# Patient Record
Sex: MALE | Race: WHITE | Employment: OTHER | URBAN - METROPOLITAN AREA
[De-identification: names, ages, dates, MRNs, and addresses within clinical notes are randomized per-mention and may not be internally consistent; named-entity substitution may affect disease eponyms.]

---

## 2024-01-08 ENCOUNTER — OFFICE VISIT (OUTPATIENT)
Dept: OPHTHALMOLOGY | Facility: CLINIC | Age: 64
End: 2024-01-08
Payer: MEDICARE

## 2024-01-08 DIAGNOSIS — H25.13 AGE-RELATED NUCLEAR CATARACT OF BOTH EYES: ICD-10-CM

## 2024-01-08 DIAGNOSIS — H18.603 KERATOCONUS OF BOTH EYES: Primary | ICD-10-CM

## 2024-01-08 DIAGNOSIS — H01.029 SQUAMOUS BLEPHARITIS OF BOTH UPPER AND LOWER EYELID: ICD-10-CM

## 2024-01-08 DIAGNOSIS — H52.4 PRESBYOPIA OF BOTH EYES: ICD-10-CM

## 2024-01-08 DIAGNOSIS — H52.213 IRREGULAR ASTIGMATISM OF BOTH EYES: ICD-10-CM

## 2024-01-08 DIAGNOSIS — H04.123 DRY EYE SYNDROME OF BOTH EYES: ICD-10-CM

## 2024-01-08 PROCEDURE — 92014 COMPRE OPH EXAM EST PT 1/>: CPT | Performed by: STUDENT IN AN ORGANIZED HEALTH CARE EDUCATION/TRAINING PROGRAM

## 2024-01-08 PROCEDURE — 92015 DETERMINE REFRACTIVE STATE: CPT | Performed by: STUDENT IN AN ORGANIZED HEALTH CARE EDUCATION/TRAINING PROGRAM

## 2024-01-08 RX ORDER — LEVOTHYROXINE SODIUM 125 UG/1
TABLET ORAL
COMMUNITY

## 2024-01-08 RX ORDER — LAMOTRIGINE 100 MG/1
TABLET ORAL
COMMUNITY

## 2024-01-08 RX ORDER — ASPIRIN 325 MG
50000 TABLET, DELAYED RELEASE (ENTERIC COATED) ORAL
COMMUNITY
Start: 2023-12-13

## 2024-01-08 RX ORDER — ACETAMINOPHEN, DIPHENHYDRAMINE HCL, PHENYLEPHRINE HCL 325; 25; 5 MG/1; MG/1; MG/1
TABLET ORAL
COMMUNITY

## 2024-01-08 ASSESSMENT — ENCOUNTER SYMPTOMS
NEUROLOGICAL NEGATIVE: 0
RESPIRATORY NEGATIVE: 0
ENDOCRINE NEGATIVE: 0
CONSTITUTIONAL NEGATIVE: 0
PSYCHIATRIC NEGATIVE: 0
HEMATOLOGIC/LYMPHATIC NEGATIVE: 0
ALLERGIC/IMMUNOLOGIC NEGATIVE: 0
GASTROINTESTINAL NEGATIVE: 0
EYES NEGATIVE: 0
CARDIOVASCULAR NEGATIVE: 0
MUSCULOSKELETAL NEGATIVE: 0

## 2024-01-08 ASSESSMENT — CONF VISUAL FIELD
OD_NORMAL: 1
OD_SUPERIOR_NASAL_RESTRICTION: 0
OS_INFERIOR_NASAL_RESTRICTION: 0
OS_NORMAL: 1
METHOD: COUNTING FINGERS
OD_SUPERIOR_TEMPORAL_RESTRICTION: 0
OD_INFERIOR_NASAL_RESTRICTION: 0
OS_SUPERIOR_NASAL_RESTRICTION: 0
OS_SUPERIOR_TEMPORAL_RESTRICTION: 0
OD_INFERIOR_TEMPORAL_RESTRICTION: 0
OS_INFERIOR_TEMPORAL_RESTRICTION: 0

## 2024-01-08 ASSESSMENT — CUP TO DISC RATIO
OD_RATIO: .20
OS_RATIO: .20

## 2024-01-08 ASSESSMENT — REFRACTION_CURRENTRX
OD_SPHERE: -6.75
OS_SPHERE: -7.75
OS_DIAMETER: 9.6
OS_CYLINDER: SPHERE
OS_BRAND: RGP ESSILOR
OD_DIAMETER: 9.6
OS_BASECURVE: 7.3
OD_CYLINDER: SPHERE
OD_BASECURVE: 7.5
OD_BRAND: RGP ESSILOR

## 2024-01-08 ASSESSMENT — REFRACTION_MANIFEST
OD_ADD: +2.50
OS_CYLINDER: +2.50
OS_ADD: +2.50
OD_AXIS: 162
OD_CYLINDER: +3.50
OS_SPHERE: -8.50
OS_AXIS: 035
OD_SPHERE: -7.75

## 2024-01-08 ASSESSMENT — REFRACTION_WEARINGRX
OD_AXIS: 170
OS_CYLINDER: +2.50
OS_SPHERE: -8.50
OD_CYLINDER: +3.25
OS_AXIS: 035
OD_SPHERE: -7.50

## 2024-01-08 ASSESSMENT — TONOMETRY
OS_IOP_MMHG: 19
IOP_METHOD: TONOPEN
OD_IOP_MMHG: 21

## 2024-01-08 ASSESSMENT — VISUAL ACUITY
METHOD: SNELLEN - LINEAR
CORRECTION_TYPE: GLASSES
OD_CC: 20/40
OS_CC: 20/40

## 2024-01-08 ASSESSMENT — EXTERNAL EXAM - LEFT EYE: OS_EXAM: DERMATOCHALASIS

## 2024-01-08 ASSESSMENT — EXTERNAL EXAM - RIGHT EYE: OD_EXAM: DERMATOCHALASIS

## 2024-01-08 NOTE — PROGRESS NOTES
Assessment/Plan   Diagnoses and all orders for this visit:  Keratoconus of both eyes  Irregular astigmatism of both eyes  -patient is doing well with current rigid gas permeable (RGP) [employer] lenses. Good comfort and fit  -continue with habitual pair  Dry eye syndrome of both eyes  Squamous blepharitis of both upper and lower eyelid  -asymptomatic. Pt ed to continue with lid hygiene and OTC AT's  Age-related nuclear cataract of both eyes  -non visually significant  Presbyopia of both eyes  -New spec rx released today per patient request. Refraction billed today.    RTC 1 year for annual with DFE, MRX, and CL exam

## 2025-01-30 ENCOUNTER — NURSING HOME VISIT (OUTPATIENT)
Dept: POST ACUTE CARE | Facility: EXTERNAL LOCATION | Age: 65
End: 2025-01-30
Payer: COMMERCIAL

## 2025-01-30 DIAGNOSIS — N18.9 CHRONIC KIDNEY DISEASE, UNSPECIFIED CKD STAGE: ICD-10-CM

## 2025-01-30 DIAGNOSIS — D69.6 THROMBOCYTOPENIA (CMS-HCC): ICD-10-CM

## 2025-01-30 DIAGNOSIS — S72.002D CLOSED FRACTURE OF LEFT HIP WITH ROUTINE HEALING, SUBSEQUENT ENCOUNTER: Primary | ICD-10-CM

## 2025-01-30 DIAGNOSIS — R53.1 WEAKNESS: ICD-10-CM

## 2025-01-30 DIAGNOSIS — R56.9 SEIZURE (MULTI): ICD-10-CM

## 2025-01-30 DIAGNOSIS — Z91.81 AT RISK FOR FALLS: ICD-10-CM

## 2025-01-30 DIAGNOSIS — D64.9 ANEMIA, UNSPECIFIED TYPE: ICD-10-CM

## 2025-01-30 DIAGNOSIS — U07.1 COVID-19: ICD-10-CM

## 2025-01-30 DIAGNOSIS — G47.30 SLEEP APNEA, UNSPECIFIED TYPE: ICD-10-CM

## 2025-01-30 PROCEDURE — 99305 1ST NF CARE MODERATE MDM 35: CPT | Performed by: INTERNAL MEDICINE

## 2025-01-30 NOTE — LETTER
Patient: Ilir Murillo  : 1960    Encounter Date: 2025    PLACE OF SERVICE:  [facility].    This is new/initial history and physical.    Subjective  Patient ID: Ilir Murillo is a 64 y.o. male who presents for New Patient Visit.    Mr. Jero Murillo is a 64-year-old male with recent fall and fracture to his left femur.  He suffers from seizures with recent COVID-19 infection.  He requires supportive care.    Review of Systems   Constitutional:  Negative for chills and fever.   Cardiovascular:  Negative for chest pain.   All other systems reviewed and are negative.    Objective  /78   Pulse 76   Temp 36.5 °C (97.7 °F)   Resp 16     Physical Exam  Vitals reviewed.   Constitutional:       Comments: This is a well-developed, well-nourished male, lying in bed, appearing weak.   HENT:      Right Ear: Tympanic membrane, ear canal and external ear normal.      Left Ear: Tympanic membrane, ear canal and external ear normal.   Eyes:      General: No scleral icterus.     Pupils: Pupils are equal, round, and reactive to light.   Neck:      Vascular: No carotid bruit.   Cardiovascular:      Heart sounds: Normal heart sounds, S1 normal and S2 normal. No murmur heard.     No friction rub.   Pulmonary:      Effort: Pulmonary effort is normal.      Breath sounds: Normal breath sounds and air entry.   Abdominal:      Palpations: There is no hepatomegaly, splenomegaly or mass.   Musculoskeletal:         General: No swelling or deformity. Normal range of motion.      Cervical back: Neck supple.      Right lower leg: No edema.      Left lower leg: No edema.   Lymphadenopathy:      Cervical: No cervical adenopathy.      Upper Body:      Right upper body: No axillary adenopathy.      Left upper body: No axillary adenopathy.      Lower Body: No right inguinal adenopathy. No left inguinal adenopathy.   Skin:     Comments: The patient's left hip wound is intact with no sign of infection.   Neurological:       Mental Status: He is oriented to person, place, and time. He is lethargic.      Cranial Nerves: Cranial nerves 2-12 are intact. No cranial nerve deficit.      Sensory: No sensory deficit.      Motor: Motor function is intact. No weakness.      Gait: Gait is intact.      Deep Tendon Reflexes: Reflexes normal.   Psychiatric:         Mood and Affect: Mood normal. Mood is not anxious or depressed. Affect is not angry.         Behavior: Behavior is not agitated.         Thought Content: Thought content normal.         Judgment: Judgment normal.     LAB WORK:  Laboratory studies were reviewed.    Assessment/Plan  Problem List Items Addressed This Visit    None  Visit Diagnoses         Codes    Closed fracture of left hip with routine healing, subsequent encounter    -  Primary S72.002D    Seizure (Multi)     R56.9    COVID-19     U07.1    Chronic kidney disease, unspecified CKD stage     N18.9    Sleep apnea, unspecified type     G47.30    Anemia, unspecified type     D64.9    Thrombocytopenia (CMS-[facility])     D69.6    Weakness     R53.1    At risk for falls     Z91.81        1. Left hip fracture, on pain control.  2. Seizure disorder, on antiepileptic.  3. COVID-19, stable.  4. CKD.  Monitor BMP.  5. Sleep apnea, on CPAP.  6. Anemia.  Follow CBC.  7. Thrombocytopenia.  Monitor platelet level.  8. Weakness, on PT/OT.  9. Fall risk, on fall precautions.    Scribe Attestation  By signing my name below, IBritta Scribe attest that this documentation has been prepared under the direction and in the presence of Arin Aguiar MD.     All medical record entries made by the scribe were personally dictated by me I have reviewed the chart and agree the record accurately reflects my personal performance of his history physical examination and management      Electronically Signed By: Arin Aguiar MD   2/23/25 12:01 AM

## 2025-02-09 ENCOUNTER — NURSING HOME VISIT (OUTPATIENT)
Dept: POST ACUTE CARE | Facility: EXTERNAL LOCATION | Age: 65
End: 2025-02-09
Payer: COMMERCIAL

## 2025-02-09 DIAGNOSIS — S72.002D CLOSED FRACTURE OF LEFT HIP WITH ROUTINE HEALING, SUBSEQUENT ENCOUNTER: Primary | ICD-10-CM

## 2025-02-09 DIAGNOSIS — G40.909 SEIZURE DISORDER (MULTI): ICD-10-CM

## 2025-02-09 DIAGNOSIS — G47.30 SLEEP APNEA, UNSPECIFIED TYPE: ICD-10-CM

## 2025-02-09 DIAGNOSIS — Z91.81 AT RISK FOR FALLING: ICD-10-CM

## 2025-02-09 DIAGNOSIS — U07.1 COVID-19: ICD-10-CM

## 2025-02-09 DIAGNOSIS — R53.1 WEAKNESS: ICD-10-CM

## 2025-02-09 DIAGNOSIS — J96.02 ACUTE RESPIRATORY FAILURE WITH HYPOXIA AND HYPERCAPNIA (MULTI): ICD-10-CM

## 2025-02-09 DIAGNOSIS — D69.6 THROMBOCYTOPENIA (CMS-HCC): ICD-10-CM

## 2025-02-09 DIAGNOSIS — C90.02 MULTIPLE MYELOMA IN RELAPSE (MULTI): ICD-10-CM

## 2025-02-09 DIAGNOSIS — J96.01 ACUTE RESPIRATORY FAILURE WITH HYPOXIA AND HYPERCAPNIA (MULTI): ICD-10-CM

## 2025-02-09 DIAGNOSIS — D64.9 ANEMIA, UNSPECIFIED TYPE: ICD-10-CM

## 2025-02-09 DIAGNOSIS — N18.9 CHRONIC KIDNEY DISEASE, UNSPECIFIED CKD STAGE: ICD-10-CM

## 2025-02-09 PROCEDURE — 99308 SBSQ NF CARE LOW MDM 20: CPT | Performed by: INTERNAL MEDICINE

## 2025-02-09 NOTE — LETTER
Patient: Ilir Murillo  : 1960    Encounter Date: 2025    PLACE OF SERVICE:  [facility]    This is a subsequent visit.    Subjective  Patient ID: Ilir Murillo is a 64 y.o. male who presents for Follow-up.    Mr. Jero Murillo is a 64-year-old male with recent history of fall and fracture to his left femur.  He is unable to care for himself and requires supportive care.    Review of Systems   Constitutional:  Negative for chills and fever.   Cardiovascular:  Negative for chest pain.   All other systems reviewed and are negative.    Objective  /80   Pulse 80   Temp 36.6 °C (97.9 °F)   Resp 16     Physical Exam  Vitals reviewed.   Constitutional:       Comments: This is a well-developed, well-nourished male, lying in bed, appearing weak.   HENT:      Right Ear: Tympanic membrane, ear canal and external ear normal.      Left Ear: Tympanic membrane, ear canal and external ear normal.   Eyes:      General: No scleral icterus.     Pupils: Pupils are equal, round, and reactive to light.   Neck:      Vascular: No carotid bruit.   Cardiovascular:      Heart sounds: Normal heart sounds, S1 normal and S2 normal. No murmur heard.     No friction rub.   Pulmonary:      Effort: Pulmonary effort is normal.      Breath sounds: Normal breath sounds and air entry.   Abdominal:      Palpations: There is no hepatomegaly, splenomegaly or mass.   Musculoskeletal:         General: No swelling or deformity. Normal range of motion.      Cervical back: Neck supple.      Right lower leg: No edema.      Left lower leg: No edema.   Lymphadenopathy:      Cervical: No cervical adenopathy.      Upper Body:      Right upper body: No axillary adenopathy.      Left upper body: No axillary adenopathy.      Lower Body: No right inguinal adenopathy. No left inguinal adenopathy.   Skin:     Findings: Wound (left hip, intact with no sign of infection) present.   Neurological:      Mental Status: He is oriented to person,  place, and time. He is lethargic.      Cranial Nerves: Cranial nerves 2-12 are intact. No cranial nerve deficit.      Sensory: No sensory deficit.      Motor: Motor function is intact. No weakness.      Gait: Gait is intact.      Deep Tendon Reflexes: Reflexes normal.   Psychiatric:         Mood and Affect: Mood normal. Mood is not anxious or depressed. Affect is not angry.         Behavior: Behavior is not agitated.         Thought Content: Thought content normal.         Judgment: Judgment normal.     LAB WORK: Laboratory studies reviewed.    Assessment/Plan  Problem List Items Addressed This Visit    None  Visit Diagnoses         Codes    Closed fracture of left hip with routine healing, subsequent encounter    -  Primary S72.002D    Chronic kidney disease, unspecified CKD stage     N18.9    Sleep apnea, unspecified type     G47.30    Seizure disorder (Multi)     G40.909    Anemia, unspecified type     D64.9    COVID-19     U07.1    Thrombocytopenia (CMS-HCC)     D69.6    Weakness     R53.1    At risk for falling     Z91.81        1. Left hip fracture, on pain control.  2. CKD, monitor BMP.  3. Sleep apnea, on CPAP.  4. Seizure disanemia  order, on anti-epileptic.  5. Anemia, follow CBC.  6. COVID-19, stable.  7. Thrombocytopenia, monitor platelet level.  8. Weakness, on PT/OT.  9. Fall risk, fall precautions.    Scribe Attestation  By signing my name below, IElicia Scribe attest that this documentation has been prepared under the direction and in the presence of Arin Aguiar MD.     All medical record entries made by the scribe were personally dictated by me I have reviewed the chart and agree the record accurately reflects my personal performance of his history physical examination and management      Electronically Signed By: Arin Aguiar MD   2/23/25 12:02 AM

## 2025-02-16 ENCOUNTER — NURSING HOME VISIT (OUTPATIENT)
Dept: POST ACUTE CARE | Facility: EXTERNAL LOCATION | Age: 65
End: 2025-02-16
Payer: COMMERCIAL

## 2025-02-16 DIAGNOSIS — G47.30 SLEEP APNEA, UNSPECIFIED TYPE: ICD-10-CM

## 2025-02-16 DIAGNOSIS — Z91.81 AT RISK FOR FALLING: ICD-10-CM

## 2025-02-16 DIAGNOSIS — D69.6 THROMBOCYTOPENIA (CMS-HCC): ICD-10-CM

## 2025-02-16 DIAGNOSIS — G40.909 SEIZURE DISORDER (MULTI): ICD-10-CM

## 2025-02-16 DIAGNOSIS — N18.9 CHRONIC KIDNEY DISEASE, UNSPECIFIED CKD STAGE: ICD-10-CM

## 2025-02-16 DIAGNOSIS — D64.9 ANEMIA, UNSPECIFIED TYPE: ICD-10-CM

## 2025-02-16 DIAGNOSIS — U07.1 COVID-19: ICD-10-CM

## 2025-02-16 DIAGNOSIS — R53.1 WEAKNESS: ICD-10-CM

## 2025-02-16 DIAGNOSIS — S72.002D CLOSED FRACTURE OF LEFT HIP WITH ROUTINE HEALING, SUBSEQUENT ENCOUNTER: Primary | ICD-10-CM

## 2025-02-16 PROCEDURE — 99308 SBSQ NF CARE LOW MDM 20: CPT | Performed by: INTERNAL MEDICINE

## 2025-02-16 NOTE — LETTER
Patient: Ilir Murillo  : 1960    Encounter Date: 2025    PLACE OF SERVICE:  [facility].    This is a subsequent visit.    Subjective  Patient ID: Ilir Murillo is a 64 y.o. male who presents for Follow-up.    Mr. Jero Murillo is a 64-year-old male with history of fall and fracture to his left hip.  He suffers from weakness and deconditioning.  He is unable to care for himself.  He requires supportive care.    Review of Systems   Constitutional:  Negative for chills and fever.   Cardiovascular:  Negative for chest pain.   All other systems reviewed and are negative.    Objective  /78   Pulse 76   Temp 36.6 °C (97.8 °F)   Resp 16     Physical Exam  Vitals reviewed.   Constitutional:       Comments: This is a well-developed, well-nourished male, lying in bed, appearing weak.   HENT:      Right Ear: Tympanic membrane, ear canal and external ear normal.      Left Ear: Tympanic membrane, ear canal and external ear normal.   Eyes:      General: No scleral icterus.     Pupils: Pupils are equal, round, and reactive to light.   Neck:      Vascular: No carotid bruit.   Cardiovascular:      Heart sounds: Normal heart sounds, S1 normal and S2 normal. No murmur heard.     No friction rub.   Pulmonary:      Effort: Pulmonary effort is normal.      Breath sounds: Normal breath sounds and air entry.   Abdominal:      Palpations: There is no hepatomegaly, splenomegaly or mass.   Musculoskeletal:         General: No swelling or deformity. Normal range of motion.      Cervical back: Neck supple.      Right lower leg: No edema.      Left lower leg: No edema.   Lymphadenopathy:      Cervical: No cervical adenopathy.      Upper Body:      Right upper body: No axillary adenopathy.      Left upper body: No axillary adenopathy.      Lower Body: No right inguinal adenopathy. No left inguinal adenopathy.   Skin:     Comments: The patient's left hip wound is intact with no sign of infection.   Neurological:       Mental Status: He is oriented to person, place, and time. He is lethargic.      Cranial Nerves: Cranial nerves 2-12 are intact. No cranial nerve deficit.      Sensory: No sensory deficit.      Motor: Motor function is intact. No weakness.      Gait: Gait is intact.      Deep Tendon Reflexes: Reflexes normal.   Psychiatric:         Mood and Affect: Mood normal. Mood is not anxious or depressed. Affect is not angry.         Behavior: Behavior is not agitated.         Thought Content: Thought content normal.         Judgment: Judgment normal.     LAB WORK: Laboratory studies reviewed.    Assessment/Plan  Problem List Items Addressed This Visit    None  Visit Diagnoses         Codes    Closed fracture of left hip with routine healing, subsequent encounter    -  Primary S72.002D    Anemia, unspecified type     D64.9    COVID-19     U07.1    Thrombocytopenia (CMS-[facility])     D69.6    Chronic kidney disease, unspecified CKD stage     N18.9    Seizure disorder (Multi)     G40.909    Sleep apnea, unspecified type     G47.30    Weakness     R53.1    At risk for falling     Z91.81        1. Left hip fracture, on pain control.  2. Anemia, follow CBC.  3. COVID-19, stable.  4. Thrombocytopenia, monitor platelet level.  5. CKD, monitor BMP.  6. Seizure disorder, on anti-epileptic.  7. Sleep apnea, on CPAP.  8. Weakness, on PT/OT.  9. Fall risk, fall precautions.    Scribe Attestation  By signing my name below, IBritta Scribe attest that this documentation has been prepared under the direction and in the presence of Arin Aguiar MD.     All medical record entries made by the scribe were personally dictated by me I have reviewed the chart and agree the record accurately reflects my personal performance of his history physical examination and management      Electronically Signed By: Arin Aguiar MD   2/23/25 12:03 AM

## 2025-02-18 VITALS
DIASTOLIC BLOOD PRESSURE: 80 MMHG | TEMPERATURE: 97.9 F | HEART RATE: 80 BPM | SYSTOLIC BLOOD PRESSURE: 126 MMHG | RESPIRATION RATE: 16 BRPM

## 2025-02-18 ASSESSMENT — ENCOUNTER SYMPTOMS
FEVER: 0
CHILLS: 0

## 2025-02-19 VITALS
SYSTOLIC BLOOD PRESSURE: 124 MMHG | TEMPERATURE: 97.8 F | HEART RATE: 76 BPM | DIASTOLIC BLOOD PRESSURE: 78 MMHG | RESPIRATION RATE: 16 BRPM

## 2025-02-19 VITALS
DIASTOLIC BLOOD PRESSURE: 78 MMHG | RESPIRATION RATE: 16 BRPM | TEMPERATURE: 97.7 F | SYSTOLIC BLOOD PRESSURE: 124 MMHG | HEART RATE: 76 BPM

## 2025-02-19 ASSESSMENT — ENCOUNTER SYMPTOMS
CHILLS: 0
FEVER: 0
FEVER: 0
CHILLS: 0

## 2025-02-19 NOTE — PROGRESS NOTES
PLACE OF SERVICE:  [facility].    This is new/initial history and physical.    Subjective   Patient ID: Ilir Murillo is a 64 y.o. male who presents for New Patient Visit.    Mr. Jero Murillo is a 64-year-old male with recent fall and fracture to his left femur.  He suffers from seizures with recent COVID-19 infection.  He requires supportive care.    Review of Systems   Constitutional:  Negative for chills and fever.   Cardiovascular:  Negative for chest pain.   All other systems reviewed and are negative.    Objective   /78   Pulse 76   Temp 36.5 °C (97.7 °F)   Resp 16     Physical Exam  Vitals reviewed.   Constitutional:       Comments: This is a well-developed, well-nourished male, lying in bed, appearing weak.   HENT:      Right Ear: Tympanic membrane, ear canal and external ear normal.      Left Ear: Tympanic membrane, ear canal and external ear normal.   Eyes:      General: No scleral icterus.     Pupils: Pupils are equal, round, and reactive to light.   Neck:      Vascular: No carotid bruit.   Cardiovascular:      Heart sounds: Normal heart sounds, S1 normal and S2 normal. No murmur heard.     No friction rub.   Pulmonary:      Effort: Pulmonary effort is normal.      Breath sounds: Normal breath sounds and air entry.   Abdominal:      Palpations: There is no hepatomegaly, splenomegaly or mass.   Musculoskeletal:         General: No swelling or deformity. Normal range of motion.      Cervical back: Neck supple.      Right lower leg: No edema.      Left lower leg: No edema.   Lymphadenopathy:      Cervical: No cervical adenopathy.      Upper Body:      Right upper body: No axillary adenopathy.      Left upper body: No axillary adenopathy.      Lower Body: No right inguinal adenopathy. No left inguinal adenopathy.   Skin:     Comments: The patient's left hip wound is intact with no sign of infection.   Neurological:      Mental Status: He is oriented to person, place, and time. He is  lethargic.      Cranial Nerves: Cranial nerves 2-12 are intact. No cranial nerve deficit.      Sensory: No sensory deficit.      Motor: Motor function is intact. No weakness.      Gait: Gait is intact.      Deep Tendon Reflexes: Reflexes normal.   Psychiatric:         Mood and Affect: Mood normal. Mood is not anxious or depressed. Affect is not angry.         Behavior: Behavior is not agitated.         Thought Content: Thought content normal.         Judgment: Judgment normal.     LAB WORK:  Laboratory studies were reviewed.    Assessment/Plan   Problem List Items Addressed This Visit    None  Visit Diagnoses         Codes    Closed fracture of left hip with routine healing, subsequent encounter    -  Primary S72.002D    Seizure (Multi)     R56.9    COVID-19     U07.1    Chronic kidney disease, unspecified CKD stage     N18.9    Sleep apnea, unspecified type     G47.30    Anemia, unspecified type     D64.9    Thrombocytopenia (CMS-HCC)     D69.6    Weakness     R53.1    At risk for falls     Z91.81        1. Left hip fracture, on pain control.  2. Seizure disorder, on antiepileptic.  3. COVID-19, stable.  4. CKD.  Monitor BMP.  5. Sleep apnea, on CPAP.  6. Anemia.  Follow CBC.  7. Thrombocytopenia.  Monitor platelet level.  8. Weakness, on PT/OT.  9. Fall risk, on fall precautions.    Scribe Attestation  By signing my name below, I, Hussain Morrison attest that this documentation has been prepared under the direction and in the presence of Arin Aguiar MD.     All medical record entries made by the scribe were personally dictated by me I have reviewed the chart and agree the record accurately reflects my personal performance of his history physical examination and management

## 2025-02-19 NOTE — PROGRESS NOTES
PLACE OF SERVICE:  [facility]    This is a subsequent visit.    Subjective   Patient ID: Ilir Murillo is a 64 y.o. male who presents for Follow-up.    Mr. Jero Murillo is a 64-year-old male with recent history of fall and fracture to his left femur.  He is unable to care for himself and requires supportive care.    Review of Systems   Constitutional:  Negative for chills and fever.   Cardiovascular:  Negative for chest pain.   All other systems reviewed and are negative.    Objective   /80   Pulse 80   Temp 36.6 °C (97.9 °F)   Resp 16     Physical Exam  Vitals reviewed.   Constitutional:       Comments: This is a well-developed, well-nourished male, lying in bed, appearing weak.   HENT:      Right Ear: Tympanic membrane, ear canal and external ear normal.      Left Ear: Tympanic membrane, ear canal and external ear normal.   Eyes:      General: No scleral icterus.     Pupils: Pupils are equal, round, and reactive to light.   Neck:      Vascular: No carotid bruit.   Cardiovascular:      Heart sounds: Normal heart sounds, S1 normal and S2 normal. No murmur heard.     No friction rub.   Pulmonary:      Effort: Pulmonary effort is normal.      Breath sounds: Normal breath sounds and air entry.   Abdominal:      Palpations: There is no hepatomegaly, splenomegaly or mass.   Musculoskeletal:         General: No swelling or deformity. Normal range of motion.      Cervical back: Neck supple.      Right lower leg: No edema.      Left lower leg: No edema.   Lymphadenopathy:      Cervical: No cervical adenopathy.      Upper Body:      Right upper body: No axillary adenopathy.      Left upper body: No axillary adenopathy.      Lower Body: No right inguinal adenopathy. No left inguinal adenopathy.   Skin:     Findings: Wound (left hip, intact with no sign of infection) present.   Neurological:      Mental Status: He is oriented to person, place, and time. He is lethargic.      Cranial Nerves: Cranial  nerves 2-12 are intact. No cranial nerve deficit.      Sensory: No sensory deficit.      Motor: Motor function is intact. No weakness.      Gait: Gait is intact.      Deep Tendon Reflexes: Reflexes normal.   Psychiatric:         Mood and Affect: Mood normal. Mood is not anxious or depressed. Affect is not angry.         Behavior: Behavior is not agitated.         Thought Content: Thought content normal.         Judgment: Judgment normal.     LAB WORK: Laboratory studies reviewed.    Assessment/Plan   Problem List Items Addressed This Visit    None  Visit Diagnoses         Codes    Closed fracture of left hip with routine healing, subsequent encounter    -  Primary S72.002D    Chronic kidney disease, unspecified CKD stage     N18.9    Sleep apnea, unspecified type     G47.30    Seizure disorder (Multi)     G40.909    Anemia, unspecified type     D64.9    COVID-19     U07.1    Thrombocytopenia (CMS-HCC)     D69.6    Weakness     R53.1    At risk for falling     Z91.81        1. Left hip fracture, on pain control.  2. CKD, monitor BMP.  3. Sleep apnea, on CPAP.  4. Seizure disanemia  order, on anti-epileptic.  5. Anemia, follow CBC.  6. COVID-19, stable.  7. Thrombocytopenia, monitor platelet level.  8. Weakness, on PT/OT.  9. Fall risk, fall precautions.    Scribe Attestation  By signing my name below, I, Hussain Nolasco attest that this documentation has been prepared under the direction and in the presence of Arin Aguiar MD.     All medical record entries made by the scribe were personally dictated by me I have reviewed the chart and agree the record accurately reflects my personal performance of his history physical examination and management

## 2025-02-19 NOTE — PROGRESS NOTES
PLACE OF SERVICE:  [facility].    This is a subsequent visit.    Subjective   Patient ID: Ilir Murillo is a 64 y.o. male who presents for Follow-up.    Mr. Jero Murillo is a 64-year-old male with history of fall and fracture to his left hip.  He suffers from weakness and deconditioning.  He is unable to care for himself.  He requires supportive care.    Review of Systems   Constitutional:  Negative for chills and fever.   Cardiovascular:  Negative for chest pain.   All other systems reviewed and are negative.    Objective   /78   Pulse 76   Temp 36.6 °C (97.8 °F)   Resp 16     Physical Exam  Vitals reviewed.   Constitutional:       Comments: This is a well-developed, well-nourished male, lying in bed, appearing weak.   HENT:      Right Ear: Tympanic membrane, ear canal and external ear normal.      Left Ear: Tympanic membrane, ear canal and external ear normal.   Eyes:      General: No scleral icterus.     Pupils: Pupils are equal, round, and reactive to light.   Neck:      Vascular: No carotid bruit.   Cardiovascular:      Heart sounds: Normal heart sounds, S1 normal and S2 normal. No murmur heard.     No friction rub.   Pulmonary:      Effort: Pulmonary effort is normal.      Breath sounds: Normal breath sounds and air entry.   Abdominal:      Palpations: There is no hepatomegaly, splenomegaly or mass.   Musculoskeletal:         General: No swelling or deformity. Normal range of motion.      Cervical back: Neck supple.      Right lower leg: No edema.      Left lower leg: No edema.   Lymphadenopathy:      Cervical: No cervical adenopathy.      Upper Body:      Right upper body: No axillary adenopathy.      Left upper body: No axillary adenopathy.      Lower Body: No right inguinal adenopathy. No left inguinal adenopathy.   Skin:     Comments: The patient's left hip wound is intact with no sign of infection.   Neurological:      Mental Status: He is oriented to person, place, and time. He is  lethargic.      Cranial Nerves: Cranial nerves 2-12 are intact. No cranial nerve deficit.      Sensory: No sensory deficit.      Motor: Motor function is intact. No weakness.      Gait: Gait is intact.      Deep Tendon Reflexes: Reflexes normal.   Psychiatric:         Mood and Affect: Mood normal. Mood is not anxious or depressed. Affect is not angry.         Behavior: Behavior is not agitated.         Thought Content: Thought content normal.         Judgment: Judgment normal.     LAB WORK: Laboratory studies reviewed.    Assessment/Plan   Problem List Items Addressed This Visit    None  Visit Diagnoses         Codes    Closed fracture of left hip with routine healing, subsequent encounter    -  Primary S72.002D    Anemia, unspecified type     D64.9    COVID-19     U07.1    Thrombocytopenia (CMS-[facility])     D69.6    Chronic kidney disease, unspecified CKD stage     N18.9    Seizure disorder (Multi)     G40.909    Sleep apnea, unspecified type     G47.30    Weakness     R53.1    At risk for falling     Z91.81        1. Left hip fracture, on pain control.  2. Anemia, follow CBC.  3. COVID-19, stable.  4. Thrombocytopenia, monitor platelet level.  5. CKD, monitor BMP.  6. Seizure disorder, on anti-epileptic.  7. Sleep apnea, on CPAP.  8. Weakness, on PT/OT.  9. Fall risk, fall precautions.    Scribe Attestation  By signing my name below, I, Hussain Morrison attest that this documentation has been prepared under the direction and in the presence of Arin Aguiar MD.     All medical record entries made by the scribe were personally dictated by me I have reviewed the chart and agree the record accurately reflects my personal performance of his history physical examination and management

## 2025-02-22 ENCOUNTER — NURSING HOME VISIT (OUTPATIENT)
Dept: POST ACUTE CARE | Facility: EXTERNAL LOCATION | Age: 65
End: 2025-02-22
Payer: COMMERCIAL

## 2025-02-22 DIAGNOSIS — Z86.2 HISTORY OF ITP: ICD-10-CM

## 2025-02-22 DIAGNOSIS — N18.9 CHRONIC KIDNEY DISEASE, UNSPECIFIED CKD STAGE: ICD-10-CM

## 2025-02-22 DIAGNOSIS — G47.30 SLEEP APNEA, UNSPECIFIED TYPE: ICD-10-CM

## 2025-02-22 DIAGNOSIS — G40.909 SEIZURE DISORDER (MULTI): ICD-10-CM

## 2025-02-22 DIAGNOSIS — U07.1 COVID-19: Primary | ICD-10-CM

## 2025-02-22 DIAGNOSIS — D64.9 ANEMIA, UNSPECIFIED TYPE: ICD-10-CM

## 2025-02-22 DIAGNOSIS — Z91.81 AT RISK FOR FALLING: ICD-10-CM

## 2025-02-22 DIAGNOSIS — R53.1 WEAKNESS: ICD-10-CM

## 2025-02-22 DIAGNOSIS — S72.92XD CLOSED FRACTURE OF LEFT FEMUR WITH ROUTINE HEALING, UNSPECIFIED FRACTURE MORPHOLOGY, UNSPECIFIED PORTION OF FEMUR, SUBSEQUENT ENCOUNTER: ICD-10-CM

## 2025-02-22 PROBLEM — C90.02 MULTIPLE MYELOMA IN RELAPSE (MULTI): Status: ACTIVE | Noted: 2025-02-22

## 2025-02-22 PROBLEM — J96.01 ACUTE RESPIRATORY FAILURE WITH HYPOXIA AND HYPERCAPNIA (MULTI): Status: ACTIVE | Noted: 2025-02-22

## 2025-02-22 PROBLEM — J96.02 ACUTE RESPIRATORY FAILURE WITH HYPOXIA AND HYPERCAPNIA (MULTI): Status: ACTIVE | Noted: 2025-02-22

## 2025-02-22 PROCEDURE — 99309 SBSQ NF CARE MODERATE MDM 30: CPT | Performed by: INTERNAL MEDICINE

## 2025-02-22 NOTE — LETTER
Patient: Ilir Murillo  : 1960    Encounter Date: 2025    PLACE OF SERVICE:  [facility].    This is a subsequent visit.    Subjective  Patient ID: Ilir Murillo is a 64 y.o. male who presents for Follow-up.    Mr. Jero Murillo is a 64-year-old male with history of fall and fracture to his left femur.  He has had recent COVID-19.  He is unable to care for himself and requires supportive care.    Review of Systems   Constitutional:  Negative for chills and fever.   Cardiovascular:  Negative for chest pain.   All other systems reviewed and are negative.    Objective  /78   Pulse 76   Temp 36.6 °C (97.9 °F)   Resp 16     Physical Exam  Vitals reviewed.   Constitutional:       General: He is not in acute distress.     Comments: This is a well-developed, well-nourished male, sitting in a chair.   HENT:      Right Ear: Tympanic membrane, ear canal and external ear normal.      Left Ear: Tympanic membrane, ear canal and external ear normal.   Eyes:      General: No scleral icterus.     Pupils: Pupils are equal, round, and reactive to light.   Neck:      Vascular: No carotid bruit.   Cardiovascular:      Heart sounds: Normal heart sounds, S1 normal and S2 normal. No murmur heard.     No friction rub.   Pulmonary:      Effort: Pulmonary effort is normal.      Breath sounds: Normal breath sounds and air entry.   Abdominal:      Palpations: There is no hepatomegaly, splenomegaly or mass.   Musculoskeletal:         General: No swelling or deformity. Normal range of motion.      Cervical back: Neck supple.      Right lower leg: No edema.      Left lower leg: No edema.   Lymphadenopathy:      Cervical: No cervical adenopathy.      Upper Body:      Right upper body: No axillary adenopathy.      Left upper body: No axillary adenopathy.      Lower Body: No right inguinal adenopathy. No left inguinal adenopathy.   Skin:     Comments: The patient's left hip wound is intact with no sign of infection.    Neurological:      Mental Status: He is oriented to person, place, and time.      Cranial Nerves: Cranial nerves 2-12 are intact. No cranial nerve deficit.      Sensory: No sensory deficit.      Motor: Motor function is intact. No weakness.      Gait: Gait is intact.      Deep Tendon Reflexes: Reflexes normal.   Psychiatric:         Mood and Affect: Mood normal. Mood is not anxious or depressed. Affect is not angry.         Behavior: Behavior is not agitated.         Thought Content: Thought content normal.         Judgment: Judgment normal.     LAB WORK: Laboratory studies reviewed.    Assessment/Plan  Problem List Items Addressed This Visit    None  Visit Diagnoses         Codes    COVID-19    -  Primary U07.1    Closed fracture of left femur with routine healing, unspecified fracture morphology, unspecified portion of femur, subsequent encounter     S72.92XD    Sleep apnea, unspecified type     G47.30    Chronic kidney disease, unspecified CKD stage     N18.9    Seizure disorder (Multi)     G40.909    History of ITP     Z86.2    Anemia, unspecified type     D64.9    Weakness     R53.1    At risk for falling     Z91.81        1. COVID-19, currently stable, continue symptomatic treatment.  2. Left femur fracture, on pain control, follow-up with Orthopedics.  3. Sleep apnea, on CPAP.  4. CKD, monitor BMP.  5. Seizure order, on anti-epileptic.  6. ITP, monitor platelet level.  7. Anemia, follow CBC.  8. Weakness, on PT/OT.  9. Fall risk, fall precautions.    Scribe Attestation  By signing my name below, IBritta Scribe attest that this documentation has been prepared under the direction and in the presence of Arin Aguiar MD.     All medical record entries made by the scribe were personally dictated by me I have reviewed the chart and agree the record accurately reflects my personal performance of his history physical examination and management      Electronically Signed By: Arin Aguiar MD   2/25/25  11:27 PM

## 2025-02-24 VITALS
HEART RATE: 76 BPM | TEMPERATURE: 97.9 F | RESPIRATION RATE: 16 BRPM | DIASTOLIC BLOOD PRESSURE: 78 MMHG | SYSTOLIC BLOOD PRESSURE: 124 MMHG

## 2025-02-24 ASSESSMENT — ENCOUNTER SYMPTOMS
CHILLS: 0
FEVER: 0

## 2025-02-24 NOTE — PROGRESS NOTES
PLACE OF SERVICE:  [facility].    This is a subsequent visit.    Subjective   Patient ID: Ilir Murillo is a 64 y.o. male who presents for Follow-up.    Mr. Jero Murillo is a 64-year-old male with history of fall and fracture to his left femur.  He has had recent COVID-19.  He is unable to care for himself and requires supportive care.    Review of Systems   Constitutional:  Negative for chills and fever.   Cardiovascular:  Negative for chest pain.   All other systems reviewed and are negative.    Objective   /78   Pulse 76   Temp 36.6 °C (97.9 °F)   Resp 16     Physical Exam  Vitals reviewed.   Constitutional:       General: He is not in acute distress.     Comments: This is a well-developed, well-nourished male, sitting in a chair.   HENT:      Right Ear: Tympanic membrane, ear canal and external ear normal.      Left Ear: Tympanic membrane, ear canal and external ear normal.   Eyes:      General: No scleral icterus.     Pupils: Pupils are equal, round, and reactive to light.   Neck:      Vascular: No carotid bruit.   Cardiovascular:      Heart sounds: Normal heart sounds, S1 normal and S2 normal. No murmur heard.     No friction rub.   Pulmonary:      Effort: Pulmonary effort is normal.      Breath sounds: Normal breath sounds and air entry.   Abdominal:      Palpations: There is no hepatomegaly, splenomegaly or mass.   Musculoskeletal:         General: No swelling or deformity. Normal range of motion.      Cervical back: Neck supple.      Right lower leg: No edema.      Left lower leg: No edema.   Lymphadenopathy:      Cervical: No cervical adenopathy.      Upper Body:      Right upper body: No axillary adenopathy.      Left upper body: No axillary adenopathy.      Lower Body: No right inguinal adenopathy. No left inguinal adenopathy.   Skin:     Comments: The patient's left hip wound is intact with no sign of infection.   Neurological:      Mental Status: He is oriented to person, place,  and time.      Cranial Nerves: Cranial nerves 2-12 are intact. No cranial nerve deficit.      Sensory: No sensory deficit.      Motor: Motor function is intact. No weakness.      Gait: Gait is intact.      Deep Tendon Reflexes: Reflexes normal.   Psychiatric:         Mood and Affect: Mood normal. Mood is not anxious or depressed. Affect is not angry.         Behavior: Behavior is not agitated.         Thought Content: Thought content normal.         Judgment: Judgment normal.     LAB WORK: Laboratory studies reviewed.    Assessment/Plan   Problem List Items Addressed This Visit    None  Visit Diagnoses         Codes    COVID-19    -  Primary U07.1    Closed fracture of left femur with routine healing, unspecified fracture morphology, unspecified portion of femur, subsequent encounter     S72.92XD    Sleep apnea, unspecified type     G47.30    Chronic kidney disease, unspecified CKD stage     N18.9    Seizure disorder (Multi)     G40.909    History of ITP     Z86.2    Anemia, unspecified type     D64.9    Weakness     R53.1    At risk for falling     Z91.81        1. COVID-19, currently stable, continue symptomatic treatment.  2. Left femur fracture, on pain control, follow-up with Orthopedics.  3. Sleep apnea, on CPAP.  4. CKD, monitor BMP.  5. Seizure order, on anti-epileptic.  6. ITP, monitor platelet level.  7. Anemia, follow CBC.  8. Weakness, on PT/OT.  9. Fall risk, fall precautions.    Scribe Attestation  By signing my name below, IBritta Scribe attest that this documentation has been prepared under the direction and in the presence of Arin Aguiar MD.     All medical record entries made by the scribe were personally dictated by me I have reviewed the chart and agree the record accurately reflects my personal performance of his history physical examination and management

## 2025-07-03 ENCOUNTER — APPOINTMENT (OUTPATIENT)
Dept: OPHTHALMOLOGY | Facility: CLINIC | Age: 65
End: 2025-07-03
Payer: COMMERCIAL

## 2025-07-03 DIAGNOSIS — H52.4 PRESBYOPIA OF BOTH EYES: ICD-10-CM

## 2025-07-03 DIAGNOSIS — H18.603 KERATOCONUS OF BOTH EYES: ICD-10-CM

## 2025-07-03 DIAGNOSIS — H04.123 DRY EYE SYNDROME OF BOTH EYES: ICD-10-CM

## 2025-07-03 DIAGNOSIS — H25.13 AGE-RELATED NUCLEAR CATARACT OF BOTH EYES: ICD-10-CM

## 2025-07-03 DIAGNOSIS — H52.213 IRREGULAR ASTIGMATISM OF BOTH EYES: ICD-10-CM

## 2025-07-03 DIAGNOSIS — H18.463 PELLUCID MARGINAL DEGENERATION OF CORNEA, BILATERAL: Primary | ICD-10-CM

## 2025-07-03 DIAGNOSIS — H01.029 SQUAMOUS BLEPHARITIS OF BOTH UPPER AND LOWER EYELID: ICD-10-CM

## 2025-07-03 LAB
CENTRAL CORNEA THICKNESS (OD): 563 MICRONS
CENTRAL CORNEA THICKNESS (OS): 590 MICRONS
KMAX (OD): 49.7 DIOPTERS
KMAX (OS): 52.9 DIOPTERS
PACH THINNEST (OD): 490 MICRONS
PACH THINNEST (OS): 510 MICRONS
SIMK FLAT (OD): 42.7 DIOPTERS
SIMK FLAT (OS): 42.1 DIOPTERS
SIMK STEEP (OD): 45 DIOPTERS
SIMK STEEP (OS): 45.2 DIOPTERS

## 2025-07-03 ASSESSMENT — REFRACTION_CURRENTRX
OD_BASECURVE: 7.5
OS_BRAND: RGP ESSILOR
OS_DIAMETER: 9.6
OS_CYLINDER: SPHERE
OS_SPHERE: -8.50
OS_BASECURVE: 7.3
OD_DIAMETER: 9.6
OS_SPHERE: -7.75
OD_BASECURVE: 7.5
OD_SPHERE: -6.75
OD_BRAND: RGP ESSILOR
OD_CYLINDER: SPHERE
OS_DIAMETER: 9.6
OD_DIAMETER: 9.6
OS_BASECURVE: 7.3
OD_SPHERE: -7.50
OS_CYLINDER: SPHERE
OS_BRAND: RGP ESSILOR
OD_BRAND: RGP ESSILOR
OD_CYLINDER: SPHERE

## 2025-07-03 ASSESSMENT — REFRACTION_MANIFEST
OD_CYLINDER: -3.75
OD_AXIS: 085
OD_SPHERE: -4.25
OS_SPHERE: -3.75
OS_ADD: +3.00
OS_CYLINDER: -4.00
OD_AXIS: 075
OD_ADD: +3.00
OS_SPHERE: -3.50
OS_AXIS: 100
METHOD_AUTOREFRACTION: 1
OD_CYLINDER: -4.25
OS_CYLINDER: -4.00
OD_SPHERE: -4.00
OS_AXIS: 105

## 2025-07-03 ASSESSMENT — VISUAL ACUITY
OD_CC: 20/50
OD_CC+: -1
OS_CC: 20/60
OS_CC+: -2
VA_OR_OS_CURRENT_RX: 20/25
CORRECTION_TYPE: GLASSES
METHOD: SNELLEN - LINEAR
VA_OR_OD_CURRENT_RX: 20/30

## 2025-07-03 ASSESSMENT — CONF VISUAL FIELD
OS_NORMAL: 1
OS_SUPERIOR_TEMPORAL_RESTRICTION: 0
OS_SUPERIOR_NASAL_RESTRICTION: 0
OD_INFERIOR_TEMPORAL_RESTRICTION: 0
METHOD: COUNTING FINGERS
OD_NORMAL: 1
OD_SUPERIOR_NASAL_RESTRICTION: 0
OS_INFERIOR_NASAL_RESTRICTION: 0
OD_INFERIOR_NASAL_RESTRICTION: 0
OD_SUPERIOR_TEMPORAL_RESTRICTION: 0
OS_INFERIOR_TEMPORAL_RESTRICTION: 0

## 2025-07-03 ASSESSMENT — REFRACTION_WEARINGRX
OD_CYLINDER: -3.50
SPECS_TYPE: SVL
OD_SPHERE: -4.25
OS_AXIS: 125
OD_AXIS: 075
OS_CYLINDER: -2.75
OS_SPHERE: -5.75

## 2025-07-03 ASSESSMENT — ENCOUNTER SYMPTOMS
NEUROLOGICAL NEGATIVE: 0
ALLERGIC/IMMUNOLOGIC NEGATIVE: 0
MUSCULOSKELETAL NEGATIVE: 1
CONSTITUTIONAL NEGATIVE: 0
HEMATOLOGIC/LYMPHATIC NEGATIVE: 0
CARDIOVASCULAR NEGATIVE: 0
RESPIRATORY NEGATIVE: 0
EYES NEGATIVE: 1
ENDOCRINE NEGATIVE: 0
GASTROINTESTINAL NEGATIVE: 0
PSYCHIATRIC NEGATIVE: 0

## 2025-07-03 ASSESSMENT — TONOMETRY
OS_IOP_MMHG: 17
OD_IOP_MMHG: 16
IOP_METHOD: GOLDMANN APPLANATION

## 2025-07-03 ASSESSMENT — CUP TO DISC RATIO
OD_RATIO: .20
OS_RATIO: .20

## 2025-07-03 ASSESSMENT — EXTERNAL EXAM - RIGHT EYE: OD_EXAM: DERMATOCHALASIS

## 2025-07-03 ASSESSMENT — EXTERNAL EXAM - LEFT EYE: OS_EXAM: DERMATOCHALASIS

## 2025-07-03 NOTE — Clinical Note
Cabrera Mcclellan, can you order the lenses under CL RX #2/Finalized RX as medically necessary. With this patient being medicare I have the primary diagnosis code as pellucid marginal degeneration. Is this okay?

## 2025-07-03 NOTE — PROGRESS NOTES
Assessment/Plan   Diagnoses and all orders for this visit:  Keratoconus of both eyes/Pellucid Marginal Degeneration  Irregular astigmatism of both eyes  -patient is doing well with current rigid gas permeable (RGP) [employer] lenses. Good comfort and fit  -will re-order lenses with over-refraction obtained today and send direct to patient  Dry eye syndrome of both eyes  Squamous blepharitis of both upper and lower eyelid  -asymptomatic. Pt ed to continue with lid hygiene and OTC AT's  -offered patient RX for Xdemvy but he defers today  Age-related nuclear cataract of both eyes  -non visually significant  Presbyopia of both eyes  -New spec rx released today per patient request. Refraction billed today.    RTC 1 year for annual with DFE, MRX, and CL exam   Patient's wife contacted and informed patient's CT Lung screening of 3-3-24 was stable per Sejal GUILLEN.  She is asking if patient needs lung screening annually or can he go to every other year screening due to cost.  She stated they do pay out of pocket for this screening and wonders if it can go to every other year.      Writer did schedule patient to be seen 2/2025 for LOUANN and informed her it is recommended annually but will message Sejal Muñoz to advise.        later, MARCELA Post Christine A RN  Caller: Unspecified (Yesterday,  1:52 PM)  Yes recommended yearly but if they are comfortable going every other year that is their choice.     Left message stating the above.   Patient does have an apt with Sejal GUILLEN for 2025 and can schedule for Lung screening for 2026 at that time if patient wishes.

## 2026-07-09 ENCOUNTER — APPOINTMENT (OUTPATIENT)
Dept: OPHTHALMOLOGY | Facility: CLINIC | Age: 66
End: 2026-07-09
Payer: MEDICARE